# Patient Record
Sex: FEMALE | Race: WHITE | NOT HISPANIC OR LATINO | Employment: PART TIME | URBAN - METROPOLITAN AREA
[De-identification: names, ages, dates, MRNs, and addresses within clinical notes are randomized per-mention and may not be internally consistent; named-entity substitution may affect disease eponyms.]

---
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
## 2022-04-05 LAB
PAP RECOMMENDATION EXT: NORMAL
PAP SMEAR: NORMAL

## 2022-04-10 ENCOUNTER — HISTORICAL (OUTPATIENT)
Dept: ADMINISTRATIVE | Facility: HOSPITAL | Age: 22
End: 2022-04-10
Payer: COMMERCIAL

## 2022-04-26 VITALS
WEIGHT: 200.63 LBS | OXYGEN SATURATION: 99 % | SYSTOLIC BLOOD PRESSURE: 117 MMHG | BODY MASS INDEX: 32.24 KG/M2 | HEIGHT: 66 IN | DIASTOLIC BLOOD PRESSURE: 76 MMHG

## 2022-05-04 DIAGNOSIS — R10.11 ABDOMINAL PAIN, RIGHT UPPER QUADRANT: Primary | ICD-10-CM

## 2022-05-31 ENCOUNTER — TELEPHONE (OUTPATIENT)
Dept: INTERNAL MEDICINE | Facility: CLINIC | Age: 22
End: 2022-05-31

## 2022-05-31 ENCOUNTER — OFFICE VISIT (OUTPATIENT)
Dept: INTERNAL MEDICINE | Facility: CLINIC | Age: 22
End: 2022-05-31
Payer: COMMERCIAL

## 2022-05-31 VITALS
RESPIRATION RATE: 16 BRPM | DIASTOLIC BLOOD PRESSURE: 82 MMHG | OXYGEN SATURATION: 98 % | WEIGHT: 205 LBS | SYSTOLIC BLOOD PRESSURE: 108 MMHG | HEIGHT: 66 IN | HEART RATE: 74 BPM | BODY MASS INDEX: 32.95 KG/M2

## 2022-05-31 DIAGNOSIS — E66.9 OBESITY, UNSPECIFIED CLASSIFICATION, UNSPECIFIED OBESITY TYPE, UNSPECIFIED WHETHER SERIOUS COMORBIDITY PRESENT: Primary | ICD-10-CM

## 2022-05-31 DIAGNOSIS — M94.0 COSTOCHONDRITIS: ICD-10-CM

## 2022-05-31 PROBLEM — F98.8 ATTENTION DEFICIT DISORDER (ADD) WITHOUT HYPERACTIVITY: Status: ACTIVE | Noted: 2022-05-31

## 2022-05-31 PROBLEM — E34.9 DISORDER OF ENDOCRINE SYSTEM: Status: ACTIVE | Noted: 2022-05-31

## 2022-05-31 PROCEDURE — 3074F SYST BP LT 130 MM HG: CPT | Mod: CPTII,,, | Performed by: NURSE PRACTITIONER

## 2022-05-31 PROCEDURE — 1159F PR MEDICATION LIST DOCUMENTED IN MEDICAL RECORD: ICD-10-PCS | Mod: CPTII,,, | Performed by: NURSE PRACTITIONER

## 2022-05-31 PROCEDURE — 3008F BODY MASS INDEX DOCD: CPT | Mod: CPTII,,, | Performed by: NURSE PRACTITIONER

## 2022-05-31 PROCEDURE — 99213 OFFICE O/P EST LOW 20 MIN: CPT | Mod: ,,, | Performed by: NURSE PRACTITIONER

## 2022-05-31 PROCEDURE — 3074F PR MOST RECENT SYSTOLIC BLOOD PRESSURE < 130 MM HG: ICD-10-PCS | Mod: CPTII,,, | Performed by: NURSE PRACTITIONER

## 2022-05-31 PROCEDURE — 3008F PR BODY MASS INDEX (BMI) DOCUMENTED: ICD-10-PCS | Mod: CPTII,,, | Performed by: NURSE PRACTITIONER

## 2022-05-31 PROCEDURE — 1159F MED LIST DOCD IN RCRD: CPT | Mod: CPTII,,, | Performed by: NURSE PRACTITIONER

## 2022-05-31 PROCEDURE — 99213 PR OFFICE/OUTPT VISIT, EST, LEVL III, 20-29 MIN: ICD-10-PCS | Mod: ,,, | Performed by: NURSE PRACTITIONER

## 2022-05-31 PROCEDURE — 3079F PR MOST RECENT DIASTOLIC BLOOD PRESSURE 80-89 MM HG: ICD-10-PCS | Mod: CPTII,,, | Performed by: NURSE PRACTITIONER

## 2022-05-31 PROCEDURE — 3079F DIAST BP 80-89 MM HG: CPT | Mod: CPTII,,, | Performed by: NURSE PRACTITIONER

## 2022-05-31 RX ORDER — PANTOPRAZOLE SODIUM 40 MG/1
40 TABLET, DELAYED RELEASE ORAL
COMMUNITY
Start: 2022-02-02 | End: 2022-05-31

## 2022-05-31 RX ORDER — ESCITALOPRAM OXALATE 10 MG/1
10 TABLET ORAL
COMMUNITY
Start: 2022-02-09 | End: 2022-05-31

## 2022-05-31 RX ORDER — COPPER 313.4 MG/1
INTRAUTERINE DEVICE INTRAUTERINE
COMMUNITY
Start: 2021-10-19

## 2022-05-31 RX ORDER — ONDANSETRON 8 MG/1
8 TABLET, ORALLY DISINTEGRATING ORAL EVERY 8 HOURS PRN
COMMUNITY
Start: 2022-04-12

## 2022-05-31 RX ORDER — BUTALBITAL, ACETAMINOPHEN AND CAFFEINE 300; 40; 50 MG/1; MG/1; MG/1
1 CAPSULE ORAL EVERY 4 HOURS PRN
COMMUNITY
Start: 2022-04-14 | End: 2022-09-20

## 2022-05-31 RX ORDER — IBUPROFEN 800 MG/1
800 TABLET ORAL 3 TIMES DAILY
Qty: 30 TABLET | Refills: 1 | Status: SHIPPED | OUTPATIENT
Start: 2022-05-31 | End: 2022-09-20

## 2022-05-31 RX ORDER — DEXTROAMPHETAMINE SACCHARATE, AMPHETAMINE ASPARTATE, DEXTROAMPHETAMINE SULFATE AND AMPHETAMINE SULFATE 3.75; 3.75; 3.75; 3.75 MG/1; MG/1; MG/1; MG/1
15 TABLET ORAL
COMMUNITY
Start: 2022-02-02

## 2022-05-31 NOTE — PROGRESS NOTES
"Subjective:      Patient ID: Brit Martinez is a 21 y.o. female.    Chief Complaint: Obesity (Pt insurance does not cover Ozempic)      HPI: Patient here today for obesity recheck. At LOV, requested med for weight loss.  Samples given of Ozempic with recommendation for initiation of 0.25mg weekly dosing. Unfortunately, insurance does not cover Ozempic and she was not able to fill.     Vaccines:  Sexual screening:  STD Screen:  Cervical Cancer screening:     Review of patient's allergies indicates:  No Known Allergies    Review of Systems   Constitutional: Negative for chills, fatigue and fever.   Eyes: Negative for visual disturbance.   Respiratory: Positive for chest tightness. Negative for cough, shortness of breath and wheezing.    Musculoskeletal: Positive for arthralgias.       Objective:   /82 (BP Location: Left arm, Patient Position: Sitting)   Pulse 74   Resp 16   Ht 5' 6" (1.676 m)   Wt 93 kg (205 lb)   LMP 05/20/2022   SpO2 98%   BMI 33.09 kg/m²      The patient's weight trend is below:   Wt Readings from Last 4 Encounters:   05/31/22 93 kg (205 lb)   11/27/21 91 kg (200 lb 9.9 oz)       Physical Exam  Constitutional:       General: She is not in acute distress.     Appearance: Normal appearance. She is not ill-appearing, toxic-appearing or diaphoretic.   HENT:      Head: Normocephalic and atraumatic.   Pulmonary:      Effort: Pulmonary effort is normal.   Skin:     General: Skin is warm and dry.   Neurological:      General: No focal deficit present.      Mental Status: She is alert and oriented to person, place, and time. Mental status is at baseline.   Psychiatric:         Mood and Affect: Mood normal.         Behavior: Behavior normal.         Thought Content: Thought content normal.         Judgment: Judgment normal.         Assessment/Plan:   1. Obesity, unspecified classification, unspecified obesity type, unspecified whether serious comorbidity present  Rec TLCs.    2. " Costochondritis  RX ibuprofen to be taken with food.      Follow up in 5 months (on 10/31/2022).

## 2022-07-27 ENCOUNTER — TELEPHONE (OUTPATIENT)
Dept: INTERNAL MEDICINE | Facility: CLINIC | Age: 22
End: 2022-07-27
Payer: COMMERCIAL

## 2022-07-27 DIAGNOSIS — Z11.1 ENCOUNTER FOR PPD TEST: Primary | ICD-10-CM

## 2022-07-27 NOTE — TELEPHONE ENCOUNTER
----- Message from Eunice Sena, Patient Care Assistant sent at 7/27/2022  1:11 PM CDT -----  Regarding: TB test  Pt. Is wanting a TB test done. Wants to know if she can get that done here and or does she need to make an appointment. Please advise thanks.

## 2022-09-20 ENCOUNTER — OFFICE VISIT (OUTPATIENT)
Dept: INTERNAL MEDICINE | Facility: CLINIC | Age: 22
End: 2022-09-20
Payer: COMMERCIAL

## 2022-09-20 VITALS
OXYGEN SATURATION: 100 % | SYSTOLIC BLOOD PRESSURE: 122 MMHG | HEART RATE: 101 BPM | WEIGHT: 212 LBS | BODY MASS INDEX: 34.07 KG/M2 | HEIGHT: 66 IN | DIASTOLIC BLOOD PRESSURE: 80 MMHG | RESPIRATION RATE: 18 BRPM

## 2022-09-20 DIAGNOSIS — Z00.00 ENCOUNTER FOR WELLNESS EXAMINATION IN ADULT: ICD-10-CM

## 2022-09-20 DIAGNOSIS — R06.83 SNORING: ICD-10-CM

## 2022-09-20 DIAGNOSIS — E66.9 CLASS 1 OBESITY WITH BODY MASS INDEX (BMI) OF 34.0 TO 34.9 IN ADULT, UNSPECIFIED OBESITY TYPE, UNSPECIFIED WHETHER SERIOUS COMORBIDITY PRESENT: ICD-10-CM

## 2022-09-20 DIAGNOSIS — G47.10 HYPERSOMNIA: ICD-10-CM
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
## 2022-09-20 DIAGNOSIS — R53.83 OTHER FATIGUE: ICD-10-CM

## 2022-09-20 DIAGNOSIS — G44.229 CHRONIC TENSION-TYPE HEADACHE, NOT INTRACTABLE: ICD-10-CM

## 2022-09-20 DIAGNOSIS — Z23 NEED FOR INFLUENZA VACCINATION: Primary | ICD-10-CM

## 2022-09-20 DIAGNOSIS — M94.0 COSTOCHONDRITIS: ICD-10-CM

## 2022-09-20 DIAGNOSIS — R41.840 INATTENTION: ICD-10-CM

## 2022-09-20 PROCEDURE — 90686 FLU VACCINE (QUAD) GREATER THAN OR EQUAL TO 3YO PRESERVATIVE FREE IM: ICD-10-PCS | Mod: ,,, | Performed by: NURSE PRACTITIONER

## 2022-09-20 PROCEDURE — 3008F BODY MASS INDEX DOCD: CPT | Mod: CPTII,,, | Performed by: NURSE PRACTITIONER

## 2022-09-20 PROCEDURE — 90471 FLU VACCINE (QUAD) GREATER THAN OR EQUAL TO 3YO PRESERVATIVE FREE IM: ICD-10-PCS | Mod: ,,, | Performed by: NURSE PRACTITIONER

## 2022-09-20 PROCEDURE — 1159F PR MEDICATION LIST DOCUMENTED IN MEDICAL RECORD: ICD-10-PCS | Mod: CPTII,,, | Performed by: NURSE PRACTITIONER

## 2022-09-20 PROCEDURE — 3008F PR BODY MASS INDEX (BMI) DOCUMENTED: ICD-10-PCS | Mod: CPTII,,, | Performed by: NURSE PRACTITIONER

## 2022-09-20 PROCEDURE — 1159F MED LIST DOCD IN RCRD: CPT | Mod: CPTII,,, | Performed by: NURSE PRACTITIONER

## 2022-09-20 PROCEDURE — 99395 PREV VISIT EST AGE 18-39: CPT | Mod: 25,,, | Performed by: NURSE PRACTITIONER

## 2022-09-20 PROCEDURE — 90471 IMMUNIZATION ADMIN: CPT | Mod: ,,, | Performed by: NURSE PRACTITIONER

## 2022-09-20 PROCEDURE — 90686 IIV4 VACC NO PRSV 0.5 ML IM: CPT | Mod: ,,, | Performed by: NURSE PRACTITIONER

## 2022-09-20 PROCEDURE — 3074F PR MOST RECENT SYSTOLIC BLOOD PRESSURE < 130 MM HG: ICD-10-PCS | Mod: CPTII,,, | Performed by: NURSE PRACTITIONER

## 2022-09-20 PROCEDURE — 99395 PR PREVENTIVE VISIT,EST,18-39: ICD-10-PCS | Mod: 25,,, | Performed by: NURSE PRACTITIONER

## 2022-09-20 PROCEDURE — 3074F SYST BP LT 130 MM HG: CPT | Mod: CPTII,,, | Performed by: NURSE PRACTITIONER

## 2022-09-20 PROCEDURE — 3079F PR MOST RECENT DIASTOLIC BLOOD PRESSURE 80-89 MM HG: ICD-10-PCS | Mod: CPTII,,, | Performed by: NURSE PRACTITIONER

## 2022-09-20 PROCEDURE — 3079F DIAST BP 80-89 MM HG: CPT | Mod: CPTII,,, | Performed by: NURSE PRACTITIONER

## 2022-09-20 RX ORDER — MELOXICAM 15 MG/1
15 TABLET ORAL DAILY
COMMUNITY
Start: 2022-07-18 | End: 2023-01-05

## 2022-09-20 RX ORDER — ALPRAZOLAM 0.5 MG/1
0.5 TABLET ORAL 2 TIMES DAILY PRN
COMMUNITY
Start: 2022-06-29

## 2022-09-20 NOTE — PATIENT INSTRUCTIONS
EXERCISE  Why should I exercise?  Increased physical activity can lead to a longer life and improved health. Exercise helps prevent heart disease and many other health problems. Exercise builds strength, gives you more energy and can help you reduce stress. It is also a good way to curb your appetite and burn calories.  Who should exercise?  Increased physical activity can benefit almost everyone. Most people can begin gradual, moderate exercise on their own. If you think there is a reason you may not be able to exercise safely, talk with your doctor before beginning a new exercise program. In particular, your doctor needs to know if you have heart trouble, high blood pressure or arthritis, or if you often feel dizzy or have chest pains.  What kind of exercise should I do?  Exercises that increase your heart rate and move large muscles (such as the muscles in your legs and arms) are best. Choose an activity that you enjoy and that you can start slowly and increase gradually as you become used to it. Walking is very popular and does not require special equipment. Other good exercises include swimming, biking, jogging and dancing. Taking the stairs instead of the elevator or walking instead of driving may also be a good way to start being more active.  How long should I exercise?  Start off exercising 3 or more times a week for 20 minutes or more, and work up to at least 30 minutes, 4 to 6 times a week. This can include several short bouts of activity in a day. Exercising during a lunch break or on your way to do errands may help you add physical activity to a busy schedule. Exercising with a friend or a family member can help make it fun, and having a partner to encourage you can help you stick to it.  Is there anything I should do before and after I exercise?  You should start an exercise session with a gradual warm-up period. During this time (about 5 to 10 minutes), you should slowly stretch your muscles first,  "and then gradually increase your level of activity. For example, begin walking slowly and then  the pace.    After you are finished exercising, cool down for about 5 to 10 minutes. Again, stretch your muscles and let your heart rate slow down gradually. You can use the same stretches as in the warm-up period.    A number of warm-up and cool-down stretching exercises for your legs are shown at the end of this handout. If you are going to exercise your upper body, be sure to use stretching exercises for your arms, shoulders, chest and back.  How hard do I have to exercise?  Even small amounts of exercise are better than none at all. Start with an activity you can do comfortably. As you become more used to exercising, try to keep your heart rate at about 60% to 85% of your "maximum heart rate."    To figure out your target heart rate, subtract your age (in years) from 220. This is your maximum heart rate. Now, to calculate your target heart rate, multiply that number by 0.60 or 0.85.    For example, if you are 40 years of age, you would subtract 40 from 220, which would give you a maximum heart rate of 180 (220 - 40 = 180). Then you would multiply this number by either 0.60 or 0.85, which would give you 108 or 153 (180 x 0.60=108 and 180 x 0.85=153).    When you first start your exercise program, you may want to use the lower number (180 x 0.60=108) to calculate your target heart rate. Then, as your conditioning gradually increases, you may want to use the higher number (180 x 0.85=153) to calculate your target heart rate. Check your pulse by gently resting 2 fingers on the side of your neck and counting the beats for 1 minute. Use a watch with a second hand to time the minute.  How do I avoid injuring myself?  The safest way to keep from injuring yourself during exercise is to avoid trying to do too much too soon. Start with an activity that is fairly easy for you, such as walking. Do it for a few minutes a day " or several times a day. Then slowly increase the time and level of activity. For example, increase how fast you walk over several weeks. If you feel tired or sore, ease up somewhat on the level of exercise, or take a day off to rest. Try not to give up entirely even if you don't feel great right away! Talk with your doctor if you have questions or think you have injured yourself seriously.  What about strength training?  Most kinds of exercise will help both your heart and your other muscles. Resistance training is exercise that develops the strength and endurance of large muscle groups. Weight lifting is an example of this type of exercise. Exercise machines can also provide resistance training. Your doctor or a  at a gym can give you more information about exercising safely with weights or machines.  Warm-up and cool-down stretches  Calf Stretch  Face a wall, standing about 2 feet away from it. Keeping your heels flat and your back straight, lean forward slowly and press your hands and forehead to the wall. You should feel stretching in the area above your heels (this area is shaded in the picture). Hold the stretch for 20 seconds and then relax. Repeat.    Quad Stretch  Face a wall, standing about 1 foot away from it. Support yourself by placing your right hand against the wall. Raise your right leg behind you and grab your foot with your left hand. Gently pull your heel up toward your buttock, stretching the muscles in the front of your right leg for 20 seconds. Repeat the stretch with your left leg.    Groin Stretch  Squat down and put both hands on the floor in front of you. Stretch your left leg straight out behind you. Keep your right foot flat on the floor and lean forward with your chest into your right knee, then gradually shift weight back to your left leg, keeping it as straight as possible. Hold the stretch for 20 seconds. Repeat the stretch with your right leg behind you.    Hamstring  Stretch  Lie down with your back flat on the floor and both knees bent. Your feet should be flat on the floor, about 6 inches apart. Bend your right knee up to your chest and grab your right thigh with both hands behind your knee. Gradually straighten your right leg, feeling gentle stretching in the back of your leg. Hold the stretch for 20 seconds. Repeat the stretch with your left leg.        How much exercise do I need?  Talk to your doctor about how much exercise is right for you. A good goal for many people is to work up to exercising 4 to 6 times a week for 30 to 60 minutes at a time. Remember, though, that exercise has so many health benefits that any amount is better than none.  Sneak exercise into your day  Take the stairs instead of the elevator.   Go for a walk during your coffee break or lunch.   Walk all or part of the way to work.   Do housework at a fast pace.   [place] leaves or do other yard work.  How do I get started?  Start by talking with your family doctor. This is especially important if you haven't been active, if you have any health problems or if you're pregnant or elderly.  Start out slowly. If you've been inactive for years, you can't run a marathon after only 2 weeks of training! Begin with a 10-minute period of light exercise or a brisk walk every day and gradually increase how hard you exercise and for how long.  How do I stick with it?  Here are some tips that will help you start and stick with an exercise program:  Choose something you like to do. Make sure it suits you physically, too. For instance, swimming is easier on arthritic joints.   Get a partner. Exercising with a friend or relative can make it more fun.   Vary your routine. You may be less likely to get bored or injured if you change your exercise routine. Walk one day. Bicycle the next. Consider activities like dancing and racquet sports, and even chores like vacuuming or mowing the lawn.   Choose a comfortable time of day.  "Don't work out too soon after eating or when it's too hot or cold outside. Wait until later in the day if you're too stiff in the morning.   Don't get discouraged. It can take weeks or months before you notice some of the changes from exercise, such as weight loss.   Forget "no pain, no gain." While a little soreness is normal after you first start exercising, pain isn't. Take a break if you hurt or if you are injured.   Make exercise fun. Read, listen to music or watch TV while riding a stationary bicycle, for example. Find fun things to do, like taking a walk through the zoo. Go dancing. Learn how to play a sport you enjoy, such as tennis.  Making exercise a habit  Stick to a regular time every day.   Sign a contract committing yourself to exercise.   Put "exercise appointments" on your calendar.   Keep a daily log or diary of your exercise activities.   Check your progress. Can you walk a certain distance faster now than when you began exercising? Or is your heart rate slower now?   Ask your doctor to write a prescription for your exercise program, such as what type of exercise to do, how often to exercise and for how long.   Think about joining a health club. The cost gives some people an incentive to exercise regularly.  How can I prevent injuries?  Start every workout with a warm-up. This will make your muscles and joints more flexible. Spend 5 to 10 minutes doing some light calisthenics and stretching exercises, and perhaps brisk walking. Do the same thing when you're done working out until your heart rate returns to normal.  Pay attention to your body. Stop exercising if you feel very out of breath, dizzy, faint, nauseous or have pain.  Benefits of regular exercise  Reduces your risk of heart disease, high blood pressure, osteoporosis, diabetes and obesity   Keeps joints, tendons and ligaments flexible, which makes it easier to move around   Reduces some of the effects of aging   Contributes to your mental " well-being and helps treat depression   Helps relieve stress and anxiety   Increases your energy and endurance   Helps you sleep better   Helps you maintain a normal weight by increasing your metabolism (the rate you burn calories)  What is a target heart rate?  Measuring your heart rate (beats per minute) can tell you how hard your heart is working. You can check your heart rate by counting your pulse for 15 seconds and multiplying the beats by 4.  The chart below shows the target heart rates for people of different ages. When you're just beginning an exercise program, shoot for the lower target heart rate (60%). As your fitness improves, you can exercise harder to get your heart rate closer to the top number (85%).    What is aerobic exercise?  Aerobic exercise is the type that moves large muscle groups and causes you to breathe more deeply and your heart to work harder to pump blood. It's also called cardiovascular exercise. It improves the health of your heart and lungs.  Examples include walking, jogging, running, aerobic dance, bicycling, rowing, swimming and cross-country skiing.  What is weight-bearing exercise?  The term weight-bearing is used to describe exercises that work against the force of gravity. Weight-bearing exercise is important for building strong bones. Having strong bones helps prevent osteoporosis and bone fractures later in life.  Examples of weight-bearing exercises include walking, jogging, hiking, climbing stairs, dancing and weight training.  What about weight training?  Weight training, or strength training, builds strength and muscles. Calisthenics like push-ups are weight-training exercises too. Lifting weights is a weight-training exercise. If you have high blood pressure or other health problems, talk to your family doctor before beginning weight training.  What is the best exercise?  The best exercise is the one that you will do on a regular basis. Walking is considered one of  the best choices because it's easy, safe and inexpensive. Brisk walking can burn as many calories as running, but is less likely than running or jogging to cause injuries. Walking also doesn't require any training or special equipment, except for good shoes.  Walking is an aerobic and weight-bearing exercise, so it is good for your heart and helps prevent osteoporosis.    Why is it so important to stay properly hydrated?  Whether youre a serious athlete or recreational exerciser, its important to make sure you get the right amount of water before, during and after exercising. Water regulates your body temperature, lubricates joints and helps transport nutrients for energy and health. If youre not properly hydrated, your body will be unable to perform at its highest level, and you may experience fatigue, muscle cramps, dizziness or more serious symptoms.  How much water should I be drinking?  There are no set guidelines for water intake while exercising because everyone is different. Sweat rate, heat, humidity, exercise intensity and duration are just some of the factors that must be considered. A simple way to make sure youre staying properly hydrated is to check your urine. If your urine is consistently colorless or light yellow, you are most likely staying well hydrated. Dark yellow or carolyn-colored urine is a sign of dehydration.  The [employer] has suggested the following basic water intake guidelines for people doing moderate- to high-intensity exercise:  Drink 17 to 20 ounces of water 2 to 3 hours before you start exercising   Drink 8 ounces of water 20 to 30 minutes before you start exercising or during your warm-up   Drink 7 to 10 ounces of water every 10 to 20 minutes during exercise   Drink 8 ounces of water no more than 30 minutes after you exercise  To get a more specific measurement of how much water you should be drinking, you can measure your sweat loss. To do this, weigh yourself  on a digital scale before and after you exercise on a few different days, and then average any weight loss. Any weight loss you experience is most likely from fluid loss and needs to be replaced with water. Record this number and use it as a guide for how much you need to be drinking while you exercise. Drink 16 to 24 ounces of water for every pound of body weight you lost after you exercised. (Helpful hint: a gallon weighs about 8 pounds, a quart weighs about 2 pounds, and a pint weighs about 1 pound.) This approach to estimating water needs is especially useful for high-performance athletes, such as people who run marathons.  What about sports drinks?  While you are exercising, water is the best drink for most people, most of the time. However, if you are exercising at a high intensity for more than an hour, you may want to chose a sports drink. The calories, potassium and other nutrients in sports drinks can help provide energy and electrolytes to help you perform for a longer period of time.  Choose sports drinks wisely, as they are often high in calories, sugar and sodium. Also check the serving size - 1 bottle may contain several servings. If you drink the entire bottle, you may need to double or even triple the amounts given on the Nutrition Facts Label. Some sports drinks contain caffeine. If you use a sports drink that contains caffeine, be careful not to get too much caffeine in your diet.  What are the signs of dehydration?  Dehydration happens when you lose more fluid than you drink. When your body doesnt have enough water, it cant work properly. Dehydration can range from mild to severe. The signs of dehydration can include:  Dizziness or lightheadedness   Nausea or vomiting   Muscle cramps   Dry mouth   Sweating stops   Heart palpitations  Signs of severe dehydration can include mental confusion, weakness and loss of consciousness. Seek medical attention immediately if you have any of these  symptoms.  Severe dehydration combined with exercise can also lead to heat illness. Heat illness can occur when the body is dehydrated, which can compromise the bodys ability to cool itself. There are 3 stages of heat illness: heat cramps, heat exhaustion and heatstroke. Symptoms of heat cramps include muscle spasms in the legs, abdomen and arms. Symptoms of heat exhaustion are more serious and can include feeling faint or weak, nausea, headache, rapid pulse and low blood pressure. The most serious heat-related illness is heatstroke, and symptoms can include high body temperature, rapid pulse, flushed skin, lack of sweating, rapid breathing and possibly even delirium, loss of consciousness or seizures. Seek rapid emergency medical attention if you experience any of these symptoms of heatstroke. Untreated heatstroke can lead to death.  What is hyponatremia?  Hyponatremia is rare, but it is something that athletes should be aware of. Hyponatremia is when there is too little sodium in the body. It can occur when athletes, particularly endurance athletes, drink too much water. When sodium levels in your body are too low, your cells begin to swell with water. This can cause your brain tissue to swell, putting pressure on your brain. It can also cause your lungs to fill with fluid. Symptoms of hyponatremia can include headache, vomiting and swelling of the hands and feet.  Just how much water is too much depends on your body and the kind of activity you are doing. Talk to your family doctor if you have questions about the right amount of water to drink while exercising.    Why is good nutrition important for athletes?  As an athlete, your physical health is jasso to your active lifestyle. In the heat of the game, you depend on your strength, skill and endurance- whether youre going for the ball or making that final push across the finish line. Being the best you can be takes hours of dedicated training and practice.  But  thats not all. Like a car, your body wont run without the right fuel. With your demanding exercise needs, you must take special care to make sure youre taking in enough calories, vitamins and other nutrients to keep you going strong.  How much should I eat?  The amount of food you need varies based on your age, size and sport or activity type and level. Generally, you need to consume foods that replenish the high amount of energy you burn each day.  The amount of energy provided by a food is measured in calories. Most people need between 1,500 and 2,000 calories a day. However, for many athletes, this number can increase by as much as 1,000 to 1,500 calories or more. Teen athletes, who need enough fuel for both intense training and rapid growth and development, may need as many as 5,000 calories daily.  While everyones needs are different, you can learn over time how to balance your calorie needs in order to perform at a high level, and avoid excessive weight loss or gain.  What kind of calories should I eat?  Calories come in different forms, including carbohydrates, fats and proteins.  Carbohydrates are your bodys best source of calories. Carbohydrates are found in everything from sugar to bread. There are 2 types of carbohydrates: simple and complex. Simple carbohydrates are easier for your body to break down, so they can give you a rapid burst of energy. Complex carbohydrates take longer to break down in the body. They are a better source of energy over time, just as a log burns longer and makes a better source of heat than a piece of paper. The complex carbohydrates found in whole grain products are considered the most beneficial. Examples of complex carbohydrates include whole-grain bread, potatoes, brown rice, oatmeal and kidney beans. Health professionals recommended that 55% to 60% of your daily calories come from carbohydrates.  Fat is another important source of calories. In limited amounts, fat is a  necessary fuel source and serves other important functions for your health, such as supporting healthy skin and hair. However, too much fat can lead to heart disease and other health problems. Further, replacing carbohydrates in your diet with fat can slow you down, as your body must work harder to use fat for energy. Generally, fats should make up no more than 30% of your daily calories. When possible, choose unsaturated fats, such as those found in olive oil and nuts. These fats are better for your heart.  You should limit saturated fats and avoid trans fats altogether. These fats can raise your low-density lipoprotein (LDL) cholesterol levels, which increases your risk of heart disease and type 2 diabetes.  Nutritionists recommend that protein make up the remaining 10% to 15% of your daily calories. Protein is found in foods like meat, eggs, milk, beans and nuts. Some athletes, especially strength trainers, are often encouraged to consume large amounts of protein. While protein does play a role in building muscle, extra amounts will not cause you to bulk up. And high levels of protein consumed over time can be dangerous to your health, because metabolizing excess protein may strain your liver and kidneys.  What nutrients do I need?  You need the same 50 vitamins and minerals as everyone else. Although athletes may benefit from additional nutrients, medical professionals have not offered any specific set of guidelines. To stay healthy, just be sure the extra calories you take in each day are nutrient dense- full of calcium, iron, potassium and other healthy vitamins and minerals. Although it can be tempting to reach for junk food as an easy source of calories, focus instead on lean meats, whole grains and a variety of fruits and vegetables to fuel your body.    How should I prepare for games and events?  On game day, staying hydrated is the most important thing you can do for your performance. Your body is made up  of nearly 60% water. During a workout, you can lose that fluid rapidly when you sweat.  Dont wait until you feel thirsty to drink, as thirst can signal that you are already becoming dehydrated. Taking a drink at least every 15 to 20 minutes is a good rule of thumb, but dont drink so much that you feel overfull.  Water is generally the best way to rehydrate. If your event lasts less than an hour, water is sufficient for replacing what youve lost by sweating. If youre competing for a longer period of time, you may benefit from the extra electrolytes and carbohydrates found in sports drinks. While competing, avoid energy drinks that contain caffeine, because caffeine can actually make dehydration worse, and may make you feel jittery or anxious.  Try to eat a pre-game meal 2 to 4 hours before your event. A good pre-game meal is usually higher in complex carbohydrates and lower in protein and sugar. Avoid rich and greasy foods, which may be more difficult for you to digest and cause an upset stomach. Many people find it helpful to avoid food the hour just before a sporting event, as the digestion process uses up energy.  What if I want to lose weight?  Your body needs a lot of energy, vitamins and minerals to keep you at optimal performance. Because of this, restrictive diet plans can harm your abilities and damage your health.  Without the calories provided by both carbohydrates and fat, you may not have the energy and endurance to remain competitive. Further, less food often leads to malnutrition.  Female athletes, who often face extra pressure to stay thin, can experience loss of periods and temporary infertility. Women also face an increased risk of osteoporosis, a fragile bone condition that can result in part from a lack of calcium. If you or your  feels you need to lose weight, be sure to talk to a doctor before making major dietary changes. Your doctor can help you make good dietary choices- both for  your game and your long-term health.
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
## 2022-09-21 ENCOUNTER — HISTORICAL (OUTPATIENT)
Dept: ADMINISTRATIVE | Facility: HOSPITAL | Age: 22
End: 2022-09-21
Payer: COMMERCIAL

## 2022-09-22 ENCOUNTER — DOCUMENTATION ONLY (OUTPATIENT)
Dept: INTERNAL MEDICINE | Facility: CLINIC | Age: 22
End: 2022-09-22
Payer: COMMERCIAL

## 2022-09-24 LAB
CHOLEST SERPL-MSCNC: 153 MG/DL (ref 0–200)
HDLC SERPL-MCNC: 54 MG/DL (ref 35–70)
LDLC SERPL CALC-MCNC: 87 MG/DL (ref 0–160)
TRIGL SERPL-MCNC: 60 MG/DL (ref 40–160)

## 2022-09-26 ENCOUNTER — DOCUMENTATION ONLY (OUTPATIENT)
Dept: INTERNAL MEDICINE | Facility: CLINIC | Age: 22
End: 2022-09-26
Payer: COMMERCIAL

## 2022-09-26 ENCOUNTER — TELEPHONE (OUTPATIENT)
Dept: INTERNAL MEDICINE | Facility: CLINIC | Age: 22
End: 2022-09-26
Payer: COMMERCIAL

## 2022-09-26 NOTE — TELEPHONE ENCOUNTER
Please call and check on pt. Let her know that I have reviewed labs without evidence of infection, anemia, electrolyte imbalance or otherwise.  Urine did note WBCs and bacteria. Is she experiencing any frequency, burning, pelvic pain? Remaining work up neg.

## 2022-09-26 NOTE — TELEPHONE ENCOUNTER
Spoke to pt and she expressed verbal understanding and if note experiencing any UTI symptoms at this time and will call if any presents itself

## 2022-09-27 ENCOUNTER — DOCUMENTATION ONLY (OUTPATIENT)
Dept: ADMINISTRATIVE | Facility: HOSPITAL | Age: 22
End: 2022-09-27
Payer: COMMERCIAL

## 2022-10-27 ENCOUNTER — PATIENT MESSAGE (OUTPATIENT)
Dept: INTERNAL MEDICINE | Facility: CLINIC | Age: 22
End: 2022-10-27
Payer: COMMERCIAL

## 2022-10-27 NOTE — TELEPHONE ENCOUNTER
Julio Perea,     I also called and left you a voicemail, I have printed a copy of you immunization summary and the consent for that I had you sign for vaccine. You can come by today before 5 pm or tomorrow before 12 noon to pick it up, I will leave it at the .     Thanks  Nayely Patterson CMA

## 2023-01-04 ENCOUNTER — PATIENT MESSAGE (OUTPATIENT)
Dept: INTERNAL MEDICINE | Facility: CLINIC | Age: 23
End: 2023-01-04
Payer: COMMERCIAL

## 2023-01-05 ENCOUNTER — OFFICE VISIT (OUTPATIENT)
Dept: INTERNAL MEDICINE | Facility: CLINIC | Age: 23
End: 2023-01-05
Payer: COMMERCIAL

## 2023-01-05 ENCOUNTER — TELEPHONE (OUTPATIENT)
Dept: INTERNAL MEDICINE | Facility: CLINIC | Age: 23
End: 2023-01-05

## 2023-01-05 DIAGNOSIS — F41.9 ANXIETY AND DEPRESSION: Primary | ICD-10-CM

## 2023-01-05 DIAGNOSIS — F32.A ANXIETY AND DEPRESSION: Primary | ICD-10-CM

## 2023-01-05 PROCEDURE — 99213 OFFICE O/P EST LOW 20 MIN: CPT | Mod: 95,,, | Performed by: NURSE PRACTITIONER

## 2023-01-05 PROCEDURE — 1159F MED LIST DOCD IN RCRD: CPT | Mod: CPTII,95,, | Performed by: NURSE PRACTITIONER

## 2023-01-05 PROCEDURE — 99213 PR OFFICE/OUTPT VISIT, EST, LEVL III, 20-29 MIN: ICD-10-PCS | Mod: 95,,, | Performed by: NURSE PRACTITIONER

## 2023-01-05 PROCEDURE — 1160F PR REVIEW ALL MEDS BY PRESCRIBER/CLIN PHARMACIST DOCUMENTED: ICD-10-PCS | Mod: CPTII,95,, | Performed by: NURSE PRACTITIONER

## 2023-01-05 PROCEDURE — 1159F PR MEDICATION LIST DOCUMENTED IN MEDICAL RECORD: ICD-10-PCS | Mod: CPTII,95,, | Performed by: NURSE PRACTITIONER

## 2023-01-05 PROCEDURE — 1160F RVW MEDS BY RX/DR IN RCRD: CPT | Mod: CPTII,95,, | Performed by: NURSE PRACTITIONER

## 2023-01-05 RX ORDER — SERTRALINE HYDROCHLORIDE 100 MG/1
TABLET, FILM COATED ORAL
COMMUNITY
Start: 2022-07-05
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
## 2023-01-05 NOTE — TELEPHONE ENCOUNTER
Is there any way to email or send letter I fabricated today following telemed visit? If so, please send. Thanks

## 2023-01-05 NOTE — LETTER
January 5, 2023    Brit Martinez  [address]  [address]  [place] [id number]             Internal Medicine [facility]  Internal Medicine  [address] [place], [place] [address] [address]  Phone: [phone]   January 5, 2023     Patient: Brit Martinez   YOB: 2000   Date of Visit: 1/5/2023       To Whom it May Concern:    Brit Martinez was seen in my clinic on 1/5/2023. She has a significant history of anxiety and depression for which she takes medication. She is currently under the care of Dr. Mark Anthony Iglesias, II and Tate Lenz, Psych NP.      Please allow patient to continue to keep two support animals for maintenance of her anxiety.    If you have any questions or concerns, please don't hesitate to call.    Sincerely,         Irma Currie NP

## 2023-02-06 ENCOUNTER — PATIENT MESSAGE (OUTPATIENT)
Dept: INTERNAL MEDICINE | Facility: CLINIC | Age: 23
End: 2023-02-06
Payer: COMMERCIAL

## 2023-02-06 DIAGNOSIS — E66.9 OBESITY, UNSPECIFIED CLASSIFICATION, UNSPECIFIED OBESITY TYPE, UNSPECIFIED WHETHER SERIOUS COMORBIDITY PRESENT: Primary | ICD-10-CM

## 2023-02-06 RX ORDER — SEMAGLUTIDE 1.34 MG/ML
0.25 INJECTION, SOLUTION SUBCUTANEOUS
Qty: 1 PEN | Refills: 0 | Status: SHIPPED | OUTPATIENT
Start: 2023-02-06 | End: 2023-03-06

## 2023-02-07 ENCOUNTER — TELEPHONE (OUTPATIENT)
Dept: INTERNAL MEDICINE | Facility: CLINIC | Age: 23
End: 2023-02-07
Payer: COMMERCIAL

## 2023-02-07 NOTE — TELEPHONE ENCOUNTER
----- Message from Chapis Zarate sent at 2/7/2023 11:35 AM CST -----  Regarding: FW: appt  Left 2 messages on pt voice mail for her to call us, no response      ----- Message -----  From: Dimitris Clinton LPN  Sent: 2/6/2023   4:58 PM CST  To: Chapis Zarate  Subject: appt                                             Please contact pt to schedule wellness with NP or MD. Thanks.       30-Nov-2019 02:12

## 2023-03-06 ENCOUNTER — OFFICE VISIT (OUTPATIENT)
Dept: INTERNAL MEDICINE | Facility: CLINIC | Age: 23
End: 2023-03-06
Payer: COMMERCIAL

## 2023-03-06 ENCOUNTER — TELEPHONE (OUTPATIENT)
Dept: INTERNAL MEDICINE | Facility: CLINIC | Age: 23
End: 2023-03-06

## 2023-03-06 VITALS
HEIGHT: 66 IN | WEIGHT: 212 LBS | OXYGEN SATURATION: 99 % | RESPIRATION RATE: 16 BRPM | SYSTOLIC BLOOD PRESSURE: 116 MMHG | HEART RATE: 90 BPM | BODY MASS INDEX: 34.07 KG/M2 | DIASTOLIC BLOOD PRESSURE: 88 MMHG

## 2023-03-06 DIAGNOSIS — F98.8 ATTENTION DEFICIT DISORDER (ADD) WITHOUT HYPERACTIVITY: ICD-10-CM

## 2023-03-06 DIAGNOSIS — E66.9 OBESITY, UNSPECIFIED CLASSIFICATION, UNSPECIFIED OBESITY TYPE, UNSPECIFIED WHETHER SERIOUS COMORBIDITY PRESENT: ICD-10-CM

## 2023-03-06 DIAGNOSIS — K21.9 GASTROESOPHAGEAL REFLUX DISEASE, UNSPECIFIED WHETHER ESOPHAGITIS PRESENT: ICD-10-CM

## 2023-03-06 DIAGNOSIS — Z13.1 SCREENING FOR DIABETES MELLITUS: ICD-10-CM

## 2023-03-06 DIAGNOSIS — F41.9 ANXIETY AND DEPRESSION: ICD-10-CM

## 2023-03-06 DIAGNOSIS — Z00.00 WELL ADULT EXAM: Primary | ICD-10-CM

## 2023-03-06 DIAGNOSIS — F32.A ANXIETY AND DEPRESSION: ICD-10-CM

## 2023-03-06 PROBLEM — L25.9 CONTACT DERMATITIS: Status: ACTIVE | Noted: 2023-03-06

## 2023-03-06 PROBLEM — S20.219A CONTUSION OF UNSPECIFIED FRONT WALL OF THORAX, INITIAL ENCOUNTER: Status: ACTIVE | Noted: 2021-09-19

## 2023-03-06 PROCEDURE — 99213 PR OFFICE/OUTPT VISIT, EST, LEVL III, 20-29 MIN: ICD-10-PCS | Mod: 25,,, | Performed by: NURSE PRACTITIONER

## 2023-03-06 PROCEDURE — 99395 PREV VISIT EST AGE 18-39: CPT | Mod: ,,, | Performed by: NURSE PRACTITIONER

## 2023-03-06 PROCEDURE — 1159F PR MEDICATION LIST DOCUMENTED IN MEDICAL RECORD: ICD-10-PCS | Mod: CPTII,,, | Performed by: NURSE PRACTITIONER

## 2023-03-06 PROCEDURE — 1160F RVW MEDS BY RX/DR IN RCRD: CPT | Mod: CPTII,,, | Performed by: NURSE PRACTITIONER

## 2023-03-06 PROCEDURE — 3079F PR MOST RECENT DIASTOLIC BLOOD PRESSURE 80-89 MM HG: ICD-10-PCS | Mod: CPTII,,, | Performed by: NURSE PRACTITIONER

## 2023-03-06 PROCEDURE — 1160F PR REVIEW ALL MEDS BY PRESCRIBER/CLIN PHARMACIST DOCUMENTED: ICD-10-PCS | Mod: CPTII,,, | Performed by: NURSE PRACTITIONER

## 2023-03-06 PROCEDURE — 99395 PR PREVENTIVE VISIT,EST,18-39: ICD-10-PCS | Mod: ,,, | Performed by: NURSE PRACTITIONER

## 2023-03-06 PROCEDURE — 1159F MED LIST DOCD IN RCRD: CPT | Mod: CPTII,,, | Performed by: NURSE PRACTITIONER

## 2023-03-06 PROCEDURE — 3008F PR BODY MASS INDEX (BMI) DOCUMENTED: ICD-10-PCS | Mod: CPTII,,, | Performed by: NURSE PRACTITIONER

## 2023-03-06 PROCEDURE — 3074F SYST BP LT 130 MM HG: CPT | Mod: CPTII,,, | Performed by: NURSE PRACTITIONER

## 2023-03-06 PROCEDURE — 3008F BODY MASS INDEX DOCD: CPT | Mod: CPTII,,, | Performed by: NURSE PRACTITIONER

## 2023-03-06 PROCEDURE — 3079F DIAST BP 80-89 MM HG: CPT | Mod: CPTII,,, | Performed by: NURSE PRACTITIONER

## 2023-03-06 PROCEDURE — 3074F PR MOST RECENT SYSTOLIC BLOOD PRESSURE < 130 MM HG: ICD-10-PCS | Mod: CPTII,,, | Performed by: NURSE PRACTITIONER

## 2023-03-06 PROCEDURE — 99213 OFFICE O/P EST LOW 20 MIN: CPT | Mod: 25,,, | Performed by: NURSE PRACTITIONER

## 2023-03-06 RX ORDER — PANTOPRAZOLE SODIUM 20 MG/1
20 TABLET, DELAYED RELEASE ORAL DAILY
Qty: 30 TABLET | Refills: 3 | Status: SHIPPED | OUTPATIENT
Start: 2023-03-06 | End: 2024-03-05

## 2023-03-06 RX ORDER — SEMAGLUTIDE 1.34 MG/ML
0.5 INJECTION, SOLUTION SUBCUTANEOUS
Qty: 1 PEN | Refills: 5 | Status: SHIPPED | OUTPATIENT
Start: 2023-03-06 | End: 2023-04-05

## 2023-03-06 RX ORDER — SEMAGLUTIDE 1.34 MG/ML
0.5 INJECTION, SOLUTION SUBCUTANEOUS
Qty: 1 PEN | Refills: 0 | Status: SHIPPED | OUTPATIENT
Start: 2023-03-06 | End: 2023-03-06 | Stop reason: SDUPTHER

## 2023-03-06 NOTE — PROGRESS NOTES
"Subjective:      Patient ID: Brit Martinez is a 22 y.o. female.    Chief Complaint: Annual Exam (Pt is here for wellness and on week 4 of Ozempic)      HPI: Patient here today for annual wellness. She is due for Pap and will self schedule. Denies further issues. Will have her update fasting labs.    A separate, significant E/M service was performed for obesity. She reports that she is terrell Ozempic well. Interested in inc in dose and close follow up with further inc in future.     Review of patient's allergies indicates:   Allergen Reactions    Adhesive tape-silicones Rash       Review of Systems   Constitutional:  Negative for activity change and unexpected weight change.   HENT:  Negative for hearing loss, rhinorrhea and trouble swallowing.    Eyes:  Negative for discharge and visual disturbance.   Respiratory:  Negative for chest tightness and wheezing.    Cardiovascular:  Negative for chest pain and palpitations.   Gastrointestinal:  Negative for blood in stool, constipation, diarrhea and vomiting.   Endocrine: Negative for polydipsia and polyuria.   Genitourinary:  Negative for difficulty urinating, dysuria, hematuria and menstrual problem.   Musculoskeletal:  Negative for arthralgias, joint swelling and neck pain.   Neurological:  Negative for weakness and headaches.   Psychiatric/Behavioral:  Negative for confusion and dysphoric mood.        Objective:   Visit Vitals  /88 (BP Location: Left arm, Patient Position: Sitting, BP Method: Medium (Manual))   Pulse 90   Resp 16   Ht 5' 6" (1.676 m)   Wt 96.2 kg (212 lb)   SpO2 99%   BMI 34.22 kg/m²     The patient's weight trend is below:   Wt Readings from Last 4 Encounters:   03/06/23 96.2 kg (212 lb)   09/20/22 96.2 kg (212 lb)   05/31/22 93 kg (205 lb)   11/27/21 91 kg (200 lb 9.9 oz)      Physical Exam  Vitals and nursing note reviewed.   Constitutional:       General: She is not in acute distress.     Appearance: Normal appearance. She is obese. She is " not ill-appearing, toxic-appearing or diaphoretic.   HENT:      Head: Normocephalic and atraumatic.      Right Ear: Tympanic membrane, ear canal and external ear normal.      Left Ear: Tympanic membrane, ear canal and external ear normal.      Nose: Nose normal.      Mouth/Throat:      Mouth: Mucous membranes are moist.      Pharynx: Oropharynx is clear. No oropharyngeal exudate or posterior oropharyngeal erythema.   Eyes:      General: No scleral icterus.        Right eye: No discharge.         Left eye: No discharge.      Extraocular Movements: Extraocular movements intact.      Conjunctiva/sclera: Conjunctivae normal.      Pupils: Pupils are equal, round, and reactive to light.   Neck:      Vascular: No carotid bruit.   Cardiovascular:      Rate and Rhythm: Normal rate and regular rhythm.      Pulses: Normal pulses.      Heart sounds: Normal heart sounds. No murmur heard.  Pulmonary:      Effort: Pulmonary effort is normal. No respiratory distress.      Breath sounds: Normal breath sounds. No wheezing or rhonchi.   Abdominal:      General: Abdomen is flat. Bowel sounds are normal. There is no distension.      Palpations: Abdomen is soft. There is no mass.      Tenderness: There is no abdominal tenderness. There is no guarding or rebound.      Hernia: No hernia is present.   Musculoskeletal:         General: Normal range of motion.      Cervical back: Normal range of motion and neck supple. No rigidity or tenderness.   Lymphadenopathy:      Cervical: No cervical adenopathy.   Skin:     General: Skin is warm and dry.   Neurological:      General: No focal deficit present.      Mental Status: She is alert and oriented to person, place, and time. Mental status is at baseline.      Motor: No weakness.   Psychiatric:         Mood and Affect: Mood normal.         Thought Content: Thought content normal.         Judgment: Judgment normal.       Assessment/Plan:   1. Well adult exam  Update fasting labs.  HEALTH EDUCATION  RECOMMENDATIONS:  weight control, diet and cholesterol, exercise 150 minutes per week, stress reduction, and adequate sleep 6-8 hours per night    - CBC Auto Differential; Future  - Comprehensive Metabolic Panel; Future  - Hemoglobin A1C; Future  - Lipid Panel; Future  - TSH; Future  - Urinalysis, Reflex to Urine Culture; Future    2. Obesity, unspecified classification, unspecified obesity type, unspecified whether serious comorbidity present  Inc Ozempic to 0.5mg weekly  F/u 1 month for recheck    - semaglutide (OZEMPIC) 0.25 mg or 0.5 mg(2 mg/1.5 mL) pen injector; Inject 0.5 mg into the skin every 7 days.  Dispense: 1 pen; Refill: 5  - CBC Auto Differential; Future  - Comprehensive Metabolic Panel; Future  - Hemoglobin A1C; Future  - Lipid Panel; Future  - TSH; Future  - Urinalysis, Reflex to Urine Culture; Future    3. Anxiety and depression  Stable on current Rx'd meds  Follow up with specialist that is also caring for this problem.    - CBC Auto Differential; Future  - Comprehensive Metabolic Panel; Future  - Hemoglobin A1C; Future  - Lipid Panel; Future  - TSH; Future  - Urinalysis, Reflex to Urine Culture; Future    4. Gastroesophageal reflux disease, unspecified whether esophagitis present  Trial of pantoprazole daily x 2 weeks  [place] diet  F/u as needed  DD: s/e of Ozempic    - pantoprazole (PROTONIX) 20 MG tablet; Take 1 tablet (20 mg total) by mouth once daily.  Dispense: 30 tablet; Refill: 3    5. Attention deficit disorder (ADD) without hyperactivity  Follow up with specialist that is also caring for this problem.    6. Screening for diabetes mellitus    - Hemoglobin A1C; Future  - Urinalysis, Reflex to Urine Culture; Future      Medication List with Changes/Refills   New Medications    PANTOPRAZOLE (PROTONIX) 20 MG TABLET    Take 1 tablet (20 mg total) by mouth once daily.   Current Medications    ALPRAZOLAM (XANAX) 0.5 MG TABLET    Take 0.5 mg by mouth 2 (two) times daily as needed.    COPPER  INTRAUTERINE DEVICE (PARAGARD T 380A) 380 SQUARE MM IUD      paraguard, 0 Refill(s)    DEXTROAMPHETAMINE-AMPHETAMINE (ADDERALL) 15 MG TABLET    Take 15 mg by mouth.    ONDANSETRON (ZOFRAN-ODT) 8 MG TBDL    Take 8 mg by mouth every 8 (eight) hours as needed.    SERTRALINE (ZOLOFT) 100 MG TABLET       Changed and/or Refilled Medications    Modified Medication Previous Medication    SEMAGLUTIDE (OZEMPIC) 0.25 MG OR 0.5 MG(2 MG/1.5 ML) PEN INJECTOR semaglutide (OZEMPIC) 0.25 mg or 0.5 mg(2 mg/1.5 mL) pen injector       Inject 0.5 mg into the skin every 7 days.    Inject 0.25 mg into the skin every 7 days.        Follow up in about 1 month (around 4/6/2023).    Chemistry:  Lab Results   Component Value Date     09/15/2021    K 3.9 09/15/2021    CHLORIDE 106 09/15/2021    BUN 7.4 09/15/2021    CREATININE 0.78 09/15/2021    GLUCOSE 78 09/15/2021    CALCIUM 9.6 09/15/2021    ALKPHOS 79 09/15/2021    LABPROT 7.3 09/15/2021    ALBUMIN 3.9 09/15/2021    BILIDIR 0.2 09/15/2021    IBILI 0.10 09/15/2021    AST 14 09/15/2021    ALT 18 09/15/2021        No results found for: HGBA1C, MICROALBCREA     Hematology:  Lab Results   Component Value Date    WBC 7.6 09/15/2021    HGB 13.5 09/15/2021    HCT 41.4 09/15/2021     09/15/2021       Lipid Panel:  Lab Results   Component Value Date    CHOL 153 09/22/2022    HDL 54 09/22/2022    TRIG 60 09/22/2022        Urine:  Lab Results   Component Value Date    APPEARANCEUA HAZY 09/15/2021    PROTEINUA Negative 09/15/2021    LEUKOCYTESUR 1+ (A) 09/15/2021    RBCUA 0-1 09/15/2021    WBCUA 2-5 09/15/2021    BACTERIA Few (A) 09/15/2021

## 2023-03-29 ENCOUNTER — TELEPHONE (OUTPATIENT)
Dept: INTERNAL MEDICINE | Facility: CLINIC | Age: 23
End: 2023-03-29
Payer: COMMERCIAL

## 2023-03-29 LAB
ALBUMIN SERPL-MCNC: 4.4 G/DL (ref 3.9–5)
ALBUMIN/GLOB SERPL: 1.6 {RATIO} (ref 1.2–2.2)
ALP SERPL-CCNC: 77 IU/L (ref 44–121)
ALT SERPL-CCNC: 16 IU/L (ref 0–32)
APPEARANCE UR: CLEAR
AST SERPL-CCNC: 15 IU/L (ref 0–40)
BACTERIA #/AREA URNS HPF: ABNORMAL /[HPF]
BACTERIA UR CULT: ABNORMAL
BACTERIA UR CULT: ABNORMAL
BASOPHILS # BLD AUTO: 0 X10E3/UL (ref 0–0.2)
BASOPHILS NFR BLD AUTO: 1 %
BILIRUB SERPL-MCNC: 0.2 MG/DL (ref 0–1.2)
BILIRUB UR QL STRIP: NEGATIVE
BUN SERPL-MCNC: 13 MG/DL (ref 6–20)
BUN/CREAT SERPL: 18 (ref 9–23)
CALCIUM SERPL-MCNC: 10 MG/DL (ref 8.7–10.2)
CHLORIDE SERPL-SCNC: 104 MMOL/L (ref 96–106)
CHOLEST SERPL-MCNC: 167 MG/DL (ref 100–199)
CO2 SERPL-SCNC: 23 MMOL/L (ref 20–29)
COLOR UR: YELLOW
CREAT SERPL-MCNC: 0.74 MG/DL (ref 0.57–1)
CRYSTALS URNS MICRO: ABNORMAL
EOSINOPHIL # BLD AUTO: 0.3 X10E3/UL (ref 0–0.4)
EOSINOPHIL NFR BLD AUTO: 5 %
EPI CELLS #/AREA URNS HPF: ABNORMAL /HPF (ref 0–10)
ERYTHROCYTE [DISTWIDTH] IN BLOOD BY AUTOMATED COUNT: 12.5 % (ref 11.7–15.4)
EST. GFR  (NO RACE VARIABLE): 117 ML/MIN/1.73
GLOBULIN SER CALC-MCNC: 2.7 G/DL (ref 1.5–4.5)
GLUCOSE SERPL-MCNC: 84 MG/DL (ref 70–99)
GLUCOSE UR QL STRIP: NEGATIVE
HBA1C MFR BLD: 5.2 % (ref 4.8–5.6)
HCT VFR BLD AUTO: 43.2 % (ref 34–46.6)
HDLC SERPL-MCNC: 46 MG/DL
HGB BLD-MCNC: 14.2 G/DL (ref 11.1–15.9)
HGB UR QL STRIP: ABNORMAL
KETONES UR QL STRIP: NEGATIVE
LDLC SERPL CALC-MCNC: 107 MG/DL (ref 0–99)
LEUKOCYTE ESTERASE UR QL STRIP: ABNORMAL
LYMPHOCYTES # BLD AUTO: 1.5 X10E3/UL (ref 0.7–3.1)
LYMPHOCYTES NFR BLD AUTO: 32 %
MCH RBC QN AUTO: 30.4 PG (ref 26.6–33)
MCHC RBC AUTO-ENTMCNC: 32.9 G/DL (ref 31.5–35.7)
MCV RBC AUTO: 93 FL (ref 79–97)
MICRO URNS: ABNORMAL
MONOCYTES # BLD AUTO: 0.4 X10E3/UL (ref 0.1–0.9)
MONOCYTES NFR BLD AUTO: 8 %
MUCOUS THREADS URNS QL MICRO: PRESENT
NEUTROPHILS # BLD AUTO: 2.6 X10E3/UL (ref 1.4–7)
NEUTROPHILS NFR BLD AUTO: 54 %
NITRITE UR QL STRIP: NEGATIVE
OTHER ANTIBIOTIC SUSC ISLT: ABNORMAL
PH UR STRIP: 6 [PH] (ref 5–7.5)
PLATELET # BLD AUTO: 299 X10E3/UL (ref 150–450)
POTASSIUM SERPL-SCNC: 5 MMOL/L (ref 3.5–5.2)
PROT SERPL-MCNC: 7.1 G/DL (ref 6–8.5)
PROT UR QL STRIP: ABNORMAL
RBC # BLD AUTO: 4.67 X10E6/UL (ref 3.77–5.28)
RBC #/AREA URNS HPF: ABNORMAL /HPF (ref 0–2)
SODIUM SERPL-SCNC: 141 MMOL/L (ref 134–144)
SP GR UR STRIP: 1.02 (ref 1–1.03)
SPECIMEN STATUS REPORT: NORMAL
TRIGL SERPL-MCNC: 71 MG/DL (ref 0–149)
TSH SERPL DL<=0.005 MIU/L-ACNC: 1.18 UIU/ML (ref 0.45–4.5)
URINALYSIS REFLEX: ABNORMAL
UROBILINOGEN UR STRIP-MCNC: 0.2 MG/DL (ref 0.2–1)
VLDLC SERPL CALC-MCNC: 14 MG/DL (ref 5–40)
WBC # BLD AUTO: 4.8 X10E3/UL (ref 3.4–10.8)
WBC #/AREA URNS HPF: ABNORMAL /HPF (ref 0–5)

## 2023-03-29 NOTE — TELEPHONE ENCOUNTER
----- Message from Irma Currie NP sent at 3/29/2023 11:56 AM CDT -----  Please let pt know that labs stable.  UA noted bacteria with + culture. Is she having s/s? Lipid notes slight inc in LDL at 107. Rec healthy diet and exercise. Remaining work up stable.

## 2023-04-24 ENCOUNTER — PATIENT MESSAGE (OUTPATIENT)
Dept: INTERNAL MEDICINE | Facility: CLINIC | Age: 23
End: 2023-04-24
Payer: COMMERCIAL

## 2023-04-24 DIAGNOSIS — E66.9 OBESITY, UNSPECIFIED CLASSIFICATION, UNSPECIFIED OBESITY TYPE, UNSPECIFIED WHETHER SERIOUS COMORBIDITY PRESENT: Primary | ICD-10-CM

## 2023-04-24 RX ORDER — SEMAGLUTIDE 0.68 MG/ML
0.5 INJECTION, SOLUTION SUBCUTANEOUS
Qty: 3 ML | Refills: 3 | Status: SHIPPED | OUTPATIENT
Start: 2023-04-24 | End: 2023-04-26

## 2023-04-25 ENCOUNTER — PATIENT MESSAGE (OUTPATIENT)
Dept: INTERNAL MEDICINE | Facility: CLINIC | Age: 23
End: 2023-04-25
Payer: COMMERCIAL

## 2023-04-25 DIAGNOSIS — E66.9 OBESITY, UNSPECIFIED CLASSIFICATION, UNSPECIFIED OBESITY TYPE, UNSPECIFIED WHETHER SERIOUS COMORBIDITY PRESENT: Primary | ICD-10-CM

## 2023-04-26 ENCOUNTER — PATIENT MESSAGE (OUTPATIENT)
Dept: INTERNAL MEDICINE | Facility: CLINIC | Age: 23
End: 2023-04-26
Payer: COMMERCIAL

## 2023-04-26 RX ORDER — SEMAGLUTIDE 0.5 MG/.5ML
0.5 INJECTION, SOLUTION SUBCUTANEOUS
Qty: 4 EACH | Refills: 1 | Status: SHIPPED | OUTPATIENT
Start: 2023-04-26